# Patient Record
(demographics unavailable — no encounter records)

---

## 2025-03-12 NOTE — HISTORY OF PRESENT ILLNESS
[FreeTextEntry1] : 60 y/o male pt, referred by Dr. Dahlia Favreau-Herz, presents today to establish endocrine care with me. Other PMHx: HLD (>5 years), HTN, insomnia, panic attacks, hx mitral valve prolapse, sleep apnea, gastritis, pre-diabetes (dx 2020) Denies fatty liver disease, osteoarthritis, depression.  PSHx: deviated septum surgery  FHx: thyroid issues (mother, brother, sister)  Denies FHx of other endocrine issues.  SHx: non-smoker, no etoh use NKDA   5/24/22 Pt did not have any questions prior to the initiation of the telehealth visit.  Pt presents today feeling well with no physical complaints. He expresses the desire to lose more weight. He has a RD through his insurance but has not had an appointment in a long time.   05/17/2023 CC: "Its been a rough year. " Pt states doctors found a problem in an artery in his abdominal area. An aneurysm was found incidentally. Pt saw his vascular doctor a couple of days ago. He states the aneurysm decreased in size and he that he has to follow up in 6 months.  Pt also had a fall in which his coccyx fractured.  He states his A1c is 5.7% on 03/17/23. Pt regularly sees psychologist. Pt endorses fatigue.   12/28/2023  Pt has /77 and BMI 26.31.  CC: "I am feeling okay. I do have back pain and spinal stenosis, and went to the ER last week where they discovered a small aortic aneurysm."  Pt states that they recently lost 12 lbs, possibly due to their "cleanse", which consisted of 2 glasses of water with lemon and a tablespoon of apple cider vinegar for a week. He reported suspending the drink due to his concerns about gastritis, but continues to drink 2 glasses of water with jen and lemon.  09/22/23 A1c 5.5%    3/12/25 Patient presents today for prediabetes, last visit in 2023 Today's blood pressure 145/90 his weight is 200 (194 2023.  All is well Recently saw my PCP who recommend discontinue metformin.  He brought lab report A1c 5.7% which is higher than before. Over the past 10 mo he is less physically active. Eats a very healthy diet. Hx.  Abdominal aneurysm   [Medications verified as per pt on 05/17/2023)  Current Medications: Rosuvastatin 5 mg, Xanax (every 6 months), Lexapro  Medication modified/added this visit: Metformin 500 mg qd (rx 05/17/2023)

## 2025-03-12 NOTE — PHYSICAL EXAM
[No Acute Distress] : no acute distress [Normal Sclera/Conjunctiva] : normal sclera/conjunctiva [Normal Outer Ear/Nose] : the ears and nose were normal in appearance [Thyroid Not Enlarged] : the thyroid was not enlarged [No Thyroid Nodules] : no palpable thyroid nodules [Clear to Auscultation] : lungs were clear to auscultation bilaterally [Normal Rate] : heart rate was normal [No Edema] : no peripheral edema [Pedal Pulses Normal] : the pedal pulses are present [Soft] : abdomen soft [Spine Straight] : spine straight [No Stigmata of Cushings Syndrome] : no stigmata of Cushings Syndrome [Normal Gait] : normal gait [No Rash] : no rash [Normal Reflexes] : deep tendon reflexes were 2+ and symmetric [No Tremors] : no tremors [Oriented x3] : oriented to person, place, and time [Acanthosis Nigricans] : no acanthosis nigricans

## 2025-03-12 NOTE — ASSESSMENT
[Carbohydrate Consistent Diet] : carbohydrate consistent diet [Exercise/Effect on Glucose] : exercise/effect on glucose

## 2025-06-13 NOTE — ADDENDUM
[FreeTextEntry1] :  I, Dalila Fernandez, act solely as a scribe for Dr. Roger Ruiz on this date. 06/12/2025

## 2025-06-13 NOTE — HISTORY OF PRESENT ILLNESS
[FreeTextEntry1] : 61 y/o male pt, referred by Dr. Dahlia Favreau-Herz, presents today to establish endocrine care with me. Other PMHx: coccyx fracture (2023), HLD (>5 years), HTN, insomnia, panic attacks, mitral valve prolapse, sleep apnea, gastritis, pre-diabetes (dx 2020) No PMHx: fatty liver disease, osteoarthritis, depression, anxiety  PSHx: deviated septum surgery  FHx: thyroid issues (mother, brother, sister)  Denies FHx of other endocrine issues.  SHx: non-smoker, no etoh use NKDA  5/24/22 Pt did not have any questions prior to the initiation of the telehealth visit.  Pt presents today feeling well with no physical complaints. He expresses the desire to lose more weight. He has a RD through his insurance but has not had an appointment in a long time.   05/17/2023 CC: "Its been a rough year. " Pt states doctors found a problem in an artery in his abdominal area. An aneurysm was found incidentally. Pt saw his vascular doctor a couple of days ago. He states the aneurysm decreased in size and he that he has to follow up in 6 months.  Pt also had a fall in which his coccyx fractured.  He states his A1c is 5.7% on 03/17/23. Pt regularly sees psychologist. Pt endorses fatigue.   12/28/2023  Pt has /77 and BMI 26.31.  CC: "I am feeling okay. I do have back pain and spinal stenosis, and went to the ER last week where they discovered a small aortic aneurysm."  Pt states that they recently lost 12 lbs, possibly due to their "cleanse", which consisted of 2 glasses of water with lemon and a tablespoon of apple cider vinegar for a week. He reported suspending the drink due to his concerns about gastritis, but continues to drink 2 glasses of water with jen and lemon.  09/22/23 A1c 5.5%   3/12/25 Patient presents today for prediabetes, last visit in 2023 Today's blood pressure 145/90 his weight is 200 (194 2023. All is well Recently saw my PCP who recommend discontinue metformin.  He brought lab report A1c 5.7% which is higher than before.Over the past 10 mo he is less physically active. Eats a very healthy diet. PMHx: Abdominal aneurysm  06/12/2025 Pt has /68 and BMI 26.7. No significant weight change. CC: "I am having a lot of muscle spasms with a stabbing sensation". #back pain hx: pain initiated 20 years ago, MRI studies revealed degenerative disc disease.  [id number]: back pain with PT, PT helped with the pain.  2020: pain became more intense. 2022: initiated cortisone shots for pain, most recent shot was administered 02/2025.  Pt presents today for a f/u regarding a hx of pre-diabetes. Today, his A1c is 5.5% and POCT 100. He endorses numbness in his arms bilaterally and back pain that has worsened in recent months.  [Medications verified as per pt on 06/12/2025] Current Medications: Rosuvastatin 5 mg, Xanax (every 6 months), Lexapro, Metformin 500 mg bid (rx 05/17/2023), cyclobenzapine  Medication modified/added this visit: stop Metformin 500 mg bid for 4 weeks, then reinitiate Metformin 500 mg qd

## 2025-06-13 NOTE — PHYSICAL EXAM
[No Acute Distress] : no acute distress [Normal Sclera/Conjunctiva] : normal sclera/conjunctiva [Normal Outer Ear/Nose] : the ears and nose were normal in appearance [Thyroid Not Enlarged] : the thyroid was not enlarged [No Thyroid Nodules] : no palpable thyroid nodules [Clear to Auscultation] : lungs were clear to auscultation bilaterally [Normal Rate] : heart rate was normal [No Edema] : no peripheral edema [Pedal Pulses Normal] : the pedal pulses are present [Soft] : abdomen soft [Spine Straight] : spine straight [No Stigmata of Cushings Syndrome] : no stigmata of Cushings Syndrome [Normal Gait] : normal gait [No Rash] : no rash [Normal Reflexes] : deep tendon reflexes were 2+ and symmetric [No Tremors] : no tremors [Oriented x3] : oriented to person, place, and time [Acanthosis Nigricans] : no acanthosis nigricans [de-identified] : Proptosis and periorbital edema present

## 2025-06-13 NOTE — HISTORY OF PRESENT ILLNESS
[FreeTextEntry1] : 61 y/o male pt, referred by Dr. Dahlia Favreau-Herz, presents today to establish endocrine care with me. Other PMHx: coccyx fracture (2023), HLD (>5 years), HTN, insomnia, panic attacks, mitral valve prolapse, sleep apnea, gastritis, pre-diabetes (dx 2020) No PMHx: fatty liver disease, osteoarthritis, depression, anxiety  PSHx: deviated septum surgery  FHx: thyroid issues (mother, brother, sister)  Denies FHx of other endocrine issues.  SHx: non-smoker, no etoh use NKDA  5/24/22 Pt did not have any questions prior to the initiation of the telehealth visit.  Pt presents today feeling well with no physical complaints. He expresses the desire to lose more weight. He has a RD through his insurance but has not had an appointment in a long time.   05/17/2023 CC: "Its been a rough year. " Pt states doctors found a problem in an artery in his abdominal area. An aneurysm was found incidentally. Pt saw his vascular doctor a couple of days ago. He states the aneurysm decreased in size and he that he has to follow up in 6 months.  Pt also had a fall in which his coccyx fractured.  He states his A1c is 5.7% on 03/17/23. Pt regularly sees psychologist. Pt endorses fatigue.   12/28/2023  Pt has /77 and BMI 26.31.  CC: "I am feeling okay. I do have back pain and spinal stenosis, and went to the ER last week where they discovered a small aortic aneurysm."  Pt states that they recently lost 12 lbs, possibly due to their "cleanse", which consisted of 2 glasses of water with lemon and a tablespoon of apple cider vinegar for a week. He reported suspending the drink due to his concerns about gastritis, but continues to drink 2 glasses of water with jen and lemon.  09/22/23 A1c 5.5%   3/12/25 Patient presents today for prediabetes, last visit in 2023 Today's blood pressure 145/90 his weight is 200 (194 2023. All is well Recently saw my PCP who recommend discontinue metformin.  He brought lab report A1c 5.7% which is higher than before.Over the past 10 mo he is less physically active. Eats a very healthy diet. PMHx: Abdominal aneurysm  06/12/2025 Pt has /68 and BMI 26.7. No significant weight change. CC: "I am having a lot of muscle spasms with a stabbing sensation". #back pain hx: pain initiated 20 years ago, MRI studies revealed degenerative disc disease.  [id number]: back pain with PT, PT helped with the pain.  2020: pain became more intense. 2022: initiated cortisone shots for pain, most recent shot was administered 02/2025.  Pt presents today for a f/u regarding a hx of pre-diabetes. Today, his A1c is 5.5% and POCT 100. He endorses numbness in his arms bilaterally and back pain that has worsened in recent months.  [Medications verified as per pt on 06/12/2025] Current Medications: Rosuvastatin 5 mg, Xanax (every 6 months), Lexapro, Metformin 500 mg bid (rx 05/17/2023), cyclobenzapine  Medication modified/added this visit: stop Metformin 500 mg bid for 4 weeks, then reinitiate Metformin 500 mg qd

## 2025-06-13 NOTE — PHYSICAL EXAM
[No Acute Distress] : no acute distress [Normal Sclera/Conjunctiva] : normal sclera/conjunctiva [Normal Outer Ear/Nose] : the ears and nose were normal in appearance [Thyroid Not Enlarged] : the thyroid was not enlarged [No Thyroid Nodules] : no palpable thyroid nodules [Clear to Auscultation] : lungs were clear to auscultation bilaterally [Normal Rate] : heart rate was normal [No Edema] : no peripheral edema [Pedal Pulses Normal] : the pedal pulses are present [Soft] : abdomen soft [Spine Straight] : spine straight [No Stigmata of Cushings Syndrome] : no stigmata of Cushings Syndrome [Normal Gait] : normal gait [No Rash] : no rash [Normal Reflexes] : deep tendon reflexes were 2+ and symmetric [No Tremors] : no tremors [Oriented x3] : oriented to person, place, and time [Acanthosis Nigricans] : no acanthosis nigricans [de-identified] : Proptosis and periorbital edema present

## 2025-06-13 NOTE — END OF VISIT
[FreeTextEntry3] :  All medical record entries made by the Scribe were at my, Dr. Roger Ruiz, direction and personally dictated by me on 06/12/2025. I have reviewed the chart and agree that the record accurately reflects my personal performance of the history, physical exam, and assessment and plan. I have also personally directed, reviewed, and agreed with the chart.  [Time Spent: ___ minutes] : I have spent [unfilled] minutes of time on the encounter which excludes teaching and separately reported services.